# Patient Record
Sex: MALE | Race: WHITE | NOT HISPANIC OR LATINO | Employment: FULL TIME | URBAN - NONMETROPOLITAN AREA
[De-identification: names, ages, dates, MRNs, and addresses within clinical notes are randomized per-mention and may not be internally consistent; named-entity substitution may affect disease eponyms.]

---

## 2023-10-05 ENCOUNTER — HOSPITAL ENCOUNTER (EMERGENCY)
Facility: HOSPITAL | Age: 23
Discharge: HOME | End: 2023-10-05
Payer: COMMERCIAL

## 2023-10-05 VITALS
HEIGHT: 67 IN | RESPIRATION RATE: 18 BRPM | DIASTOLIC BLOOD PRESSURE: 77 MMHG | BODY MASS INDEX: 46.62 KG/M2 | HEART RATE: 92 BPM | WEIGHT: 297 LBS | OXYGEN SATURATION: 98 % | TEMPERATURE: 98.3 F | SYSTOLIC BLOOD PRESSURE: 144 MMHG

## 2023-10-05 DIAGNOSIS — R21 RASH: Primary | ICD-10-CM

## 2023-10-05 PROCEDURE — 2500000004 HC RX 250 GENERAL PHARMACY W/ HCPCS (ALT 636 FOR OP/ED): Performed by: PHYSICIAN ASSISTANT

## 2023-10-05 PROCEDURE — 99283 EMERGENCY DEPT VISIT LOW MDM: CPT

## 2023-10-05 PROCEDURE — 2500000001 HC RX 250 WO HCPCS SELF ADMINISTERED DRUGS (ALT 637 FOR MEDICARE OP): Performed by: PHYSICIAN ASSISTANT

## 2023-10-05 RX ORDER — LORATADINE 10 MG/1
10 TABLET ORAL DAILY
Qty: 30 TABLET | Refills: 0 | Status: SHIPPED | OUTPATIENT
Start: 2023-10-05 | End: 2023-11-04

## 2023-10-05 RX ORDER — PREDNISONE 20 MG/1
40 TABLET ORAL ONCE
Status: COMPLETED | OUTPATIENT
Start: 2023-10-05 | End: 2023-10-05

## 2023-10-05 RX ORDER — PREDNISONE 20 MG/1
40 TABLET ORAL 2 TIMES DAILY
Qty: 20 TABLET | Refills: 0 | Status: SHIPPED | OUTPATIENT
Start: 2023-10-05 | End: 2023-10-10

## 2023-10-05 RX ORDER — AMLODIPINE BESYLATE 5 MG/1
7.5 TABLET ORAL DAILY
COMMUNITY

## 2023-10-05 RX ORDER — DIPHENHYDRAMINE HCL 25 MG
50 CAPSULE ORAL ONCE
Status: COMPLETED | OUTPATIENT
Start: 2023-10-05 | End: 2023-10-05

## 2023-10-05 RX ADMIN — PREDNISONE 40 MG: 20 TABLET ORAL at 21:38

## 2023-10-05 RX ADMIN — DIPHENHYDRAMINE HYDROCHLORIDE 50 MG: 25 CAPSULE ORAL at 21:38

## 2023-10-05 ASSESSMENT — COLUMBIA-SUICIDE SEVERITY RATING SCALE - C-SSRS
1. IN THE PAST MONTH, HAVE YOU WISHED YOU WERE DEAD OR WISHED YOU COULD GO TO SLEEP AND NOT WAKE UP?: NO
6. HAVE YOU EVER DONE ANYTHING, STARTED TO DO ANYTHING, OR PREPARED TO DO ANYTHING TO END YOUR LIFE?: NO
2. HAVE YOU ACTUALLY HAD ANY THOUGHTS OF KILLING YOURSELF?: NO

## 2023-10-05 ASSESSMENT — ENCOUNTER SYMPTOMS
CHILLS: 0
STRIDOR: 0
WHEEZING: 0
FACIAL SWELLING: 0
SORE THROAT: 0
SHORTNESS OF BREATH: 0
FEVER: 0
TROUBLE SWALLOWING: 0

## 2023-10-05 ASSESSMENT — PAIN SCALES - GENERAL: PAINLEVEL_OUTOF10: 0 - NO PAIN

## 2023-10-05 ASSESSMENT — PAIN - FUNCTIONAL ASSESSMENT: PAIN_FUNCTIONAL_ASSESSMENT: 0-10

## 2023-10-05 NOTE — Clinical Note
Yevgeniy Cisneros was seen and treated in our emergency department on 10/5/2023.  He may return to work on 10/06/2023.       If you have any questions or concerns, please don't hesitate to call.      Quita Kay PA-C

## 2023-10-06 NOTE — ED PROVIDER NOTES
Patient is a 23-year-old male who presents to the emergency department with a chief complaint of a rash to bilateral lower extremities.  He denies the rash being itchy.  He reports that he was in the woods today and noticed the rash shortly thereafter.  He denies any new skin products or detergents.  No new soaps.  He denies any shortness of breath or difficulty breathing.  No throat, tongue, or lip swelling.  No alleviating or exacerbating factors.  He has not taken any over-the-counter medications.      History provided by:  Patient       Review of Systems   Constitutional:  Negative for chills and fever.   HENT:  Negative for facial swelling, sneezing, sore throat and trouble swallowing.    Respiratory:  Negative for shortness of breath, wheezing and stridor.    All other systems reviewed and are negative.       Physical Exam  Vitals and nursing note reviewed.   Constitutional:       General: He is not in acute distress.     Appearance: He is well-developed.   HENT:      Head: Normocephalic and atraumatic.      Mouth/Throat:      Mouth: No oral lesions or angioedema.   Eyes:      Extraocular Movements: Extraocular movements intact.      Conjunctiva/sclera: Conjunctivae normal.   Cardiovascular:      Rate and Rhythm: Normal rate and regular rhythm.      Heart sounds: No murmur heard.  Pulmonary:      Effort: Pulmonary effort is normal. No respiratory distress.      Breath sounds: Normal breath sounds.   Abdominal:      Palpations: Abdomen is soft.      Tenderness: There is no abdominal tenderness.   Musculoskeletal:         General: No swelling.      Cervical back: Neck supple.   Skin:     General: Skin is warm and dry.      Capillary Refill: Capillary refill takes less than 2 seconds.      Findings: Rash (maculopapular rash noted to bilateral calves) present.   Neurological:      Mental Status: He is alert.   Psychiatric:         Mood and Affect: Mood normal.          Labs Reviewed - No data to display     No  orders to display        Procedures     Diagnoses as of 10/05/23 2136   Rash       Medical Decision Making  Patient presents to the emergency department with a chief complaint of a rash to bilateral lower extremities.  Physical appearance is consistent with a contact dermatitis.  Patient treated with Benadryl and oral steroids.  Will be discharged home on a low-dose course of steroids and antihistamines.  Differential diagnosis includes but not limited to rash, contact dermatitis, Toxicodendron dermatitis, allergic reaction         Problem List Items Addressed This Visit    None  Visit Diagnoses       Rash    -  Primary    Relevant Medications    loratadine (Claritin) 10 mg tablet    predniSONE (Deltasone) 20 mg tablet                   Quita Kay PA-C  10/05/23 2137

## 2023-10-22 ENCOUNTER — HOSPITAL ENCOUNTER (EMERGENCY)
Facility: HOSPITAL | Age: 23
Discharge: HOME | End: 2023-10-22
Attending: EMERGENCY MEDICINE
Payer: COMMERCIAL

## 2023-10-22 ENCOUNTER — APPOINTMENT (OUTPATIENT)
Dept: RADIOLOGY | Facility: HOSPITAL | Age: 23
End: 2023-10-22
Payer: COMMERCIAL

## 2023-10-22 VITALS
HEART RATE: 83 BPM | TEMPERATURE: 97.2 F | RESPIRATION RATE: 18 BRPM | WEIGHT: 292 LBS | BODY MASS INDEX: 45.83 KG/M2 | SYSTOLIC BLOOD PRESSURE: 150 MMHG | OXYGEN SATURATION: 99 % | HEIGHT: 67 IN | DIASTOLIC BLOOD PRESSURE: 71 MMHG

## 2023-10-22 DIAGNOSIS — R06.02 SHORTNESS OF BREATH: Primary | ICD-10-CM

## 2023-10-22 PROCEDURE — 93005 ELECTROCARDIOGRAM TRACING: CPT

## 2023-10-22 PROCEDURE — 99283 EMERGENCY DEPT VISIT LOW MDM: CPT | Performed by: EMERGENCY MEDICINE

## 2023-10-22 PROCEDURE — 71046 X-RAY EXAM CHEST 2 VIEWS: CPT | Mod: FY,FR

## 2023-10-22 PROCEDURE — 71046 X-RAY EXAM CHEST 2 VIEWS: CPT | Mod: FOREIGN READ | Performed by: RADIOLOGY

## 2023-10-22 RX ORDER — ALBUTEROL SULFATE 90 UG/1
1-2 AEROSOL, METERED RESPIRATORY (INHALATION) EVERY 6 HOURS PRN
Qty: 18 G | Refills: 0 | Status: SHIPPED | OUTPATIENT
Start: 2023-10-22 | End: 2023-11-21

## 2023-10-22 ASSESSMENT — COLUMBIA-SUICIDE SEVERITY RATING SCALE - C-SSRS
6. HAVE YOU EVER DONE ANYTHING, STARTED TO DO ANYTHING, OR PREPARED TO DO ANYTHING TO END YOUR LIFE?: NO
2. HAVE YOU ACTUALLY HAD ANY THOUGHTS OF KILLING YOURSELF?: NO
1. IN THE PAST MONTH, HAVE YOU WISHED YOU WERE DEAD OR WISHED YOU COULD GO TO SLEEP AND NOT WAKE UP?: NO

## 2023-10-22 ASSESSMENT — PAIN - FUNCTIONAL ASSESSMENT: PAIN_FUNCTIONAL_ASSESSMENT: 0-10

## 2023-10-22 ASSESSMENT — PAIN SCALES - GENERAL: PAINLEVEL_OUTOF10: 0 - NO PAIN

## 2023-10-22 NOTE — ED PROVIDER NOTES
"HPI   Chief Complaint   Patient presents with    Shortness of Breath     Intermittent SOB x1 week accompanied with upper abdominal discomfort and then he states that he will have bouts of diarrhea afterwards. Pt states that he has lost 20 lbs in the last couple months and feels like that may have something to do with it. Denies dizziness or CP.        Patient presents to the emergency department secondary to dyspnea.  The patient states that this happens intermittently and has done so for several weeks.  He states \"I think I am just overdoing it at the gym\".  He states that the shortness of breath is not necessarily related to activity but sometimes it is.  He is somewhat vague in his history.  He also reports that he had a history of asthma as a child and he used to use an inhaler but has not for some time.  Denies chest pain.  No known sick contacts.  Denies cough or fever.      History provided by:  Patient   used: No                        No data recorded                Patient History   Past Medical History:   Diagnosis Date    Hypertension      History reviewed. No pertinent surgical history.  No family history on file.  Social History     Tobacco Use    Smoking status: Former     Types: Cigarettes    Smokeless tobacco: Never   Substance Use Topics    Alcohol use: Not Currently    Drug use: Not Currently       Physical Exam   ED Triage Vitals [10/22/23 1313]   Temp Heart Rate Resp BP   36.2 °C (97.2 °F) 83 18 150/71      SpO2 Temp src Heart Rate Source Patient Position   99 % -- Monitor --      BP Location FiO2 (%)     -- --       Physical Exam  Vitals and nursing note reviewed.   Constitutional:       General: He is not in acute distress.     Appearance: Normal appearance. He is obese. He is not ill-appearing, toxic-appearing or diaphoretic.      Comments: When I walk into the room the patient is lying in bed with his hands behind his head and his legs crossed.  He appears quite " comfortable.  He is not dyspneic or diaphoretic.  Speaking in full sentences.   HENT:      Head: Normocephalic and atraumatic.      Nose: Nose normal. No rhinorrhea.   Neck:      Comments: Trachea is midline  Cardiovascular:      Rate and Rhythm: Normal rate and regular rhythm.      Heart sounds: No murmur heard.  Pulmonary:      Effort: Pulmonary effort is normal. No tachypnea.      Breath sounds: Normal breath sounds. No decreased breath sounds, wheezing, rhonchi or rales.   Abdominal:      General: Abdomen is flat. Bowel sounds are normal. There is no distension.      Palpations: Abdomen is soft.      Tenderness: There is no abdominal tenderness.   Musculoskeletal:         General: Normal range of motion.      Cervical back: Normal range of motion.      Right lower leg: No edema.      Left lower leg: No edema.   Skin:     General: Skin is warm and dry.      Findings: No rash.   Neurological:      General: No focal deficit present.      Mental Status: He is alert and oriented to person, place, and time. Mental status is at baseline.   Psychiatric:         Mood and Affect: Mood normal.         Behavior: Behavior normal.         Thought Content: Thought content normal.         Judgment: Judgment normal.         ED Course & MDM   Diagnoses as of 10/22/23 1404   Shortness of breath       Medical Decision Making  Patient is at low risk as per his heart score for ACS.  He has no risk factors that I can identify for pulmonary embolism therefore I feel this is an unlikely diagnosis as per the Wells criteria.  Differential diagnosis would include, but not limited to, overexertion, mild underlying asthma, amongst many others.  Given the patient's well appearance, overall normal vital signs in regards to his pulse ox at heart rate, and negative studies here I feel he can be discharged safely.  Patient will be given a prescription for an MDI and instructed to follow-up with primary care.  Limit activity as tolerated and  return if worse    Amount and/or Complexity of Data Reviewed  ECG/medicine tests: independent interpretation performed.     Details: Twelve-lead EKG was interpreted by myself and this was noted to contribute directly to patient care.  Study reveals normal sinus rhythm with a sinus arrhythmia, rate of 81 bpm, normal axis, normal R wave progression, no acute ischemic changes, no S1 Q3 T3 is identified        Procedure  Procedures     Leonard Owusu, DO  10/22/23 1700

## 2024-05-21 ENCOUNTER — APPOINTMENT (OUTPATIENT)
Dept: RADIOLOGY | Facility: HOSPITAL | Age: 24
End: 2024-05-21
Payer: COMMERCIAL

## 2024-05-21 ENCOUNTER — HOSPITAL ENCOUNTER (OUTPATIENT)
Dept: CARDIOLOGY | Facility: HOSPITAL | Age: 24
Discharge: HOME | End: 2024-05-21
Payer: COMMERCIAL

## 2024-05-21 ENCOUNTER — HOSPITAL ENCOUNTER (EMERGENCY)
Facility: HOSPITAL | Age: 24
Discharge: HOME | End: 2024-05-21
Attending: EMERGENCY MEDICINE
Payer: COMMERCIAL

## 2024-05-21 VITALS
OXYGEN SATURATION: 98 % | BODY MASS INDEX: 35 KG/M2 | WEIGHT: 223 LBS | SYSTOLIC BLOOD PRESSURE: 125 MMHG | TEMPERATURE: 98.5 F | HEIGHT: 67 IN | DIASTOLIC BLOOD PRESSURE: 78 MMHG | HEART RATE: 73 BPM | RESPIRATION RATE: 16 BRPM

## 2024-05-21 DIAGNOSIS — I10 HYPERTENSION, UNSPECIFIED TYPE: ICD-10-CM

## 2024-05-21 DIAGNOSIS — G44.89 OTHER HEADACHE SYNDROME: Primary | ICD-10-CM

## 2024-05-21 LAB
ALBUMIN SERPL BCP-MCNC: 5 G/DL (ref 3.4–5)
ALP SERPL-CCNC: 66 U/L (ref 33–120)
ALT SERPL W P-5'-P-CCNC: 21 U/L (ref 10–52)
ANION GAP SERPL CALC-SCNC: 9 MMOL/L (ref 10–20)
APPEARANCE UR: CLEAR
AST SERPL W P-5'-P-CCNC: 20 U/L (ref 9–39)
BASOPHILS # BLD AUTO: 0.04 X10*3/UL (ref 0–0.1)
BASOPHILS NFR BLD AUTO: 0.6 %
BILIRUB DIRECT SERPL-MCNC: 0.2 MG/DL (ref 0–0.3)
BILIRUB SERPL-MCNC: 0.9 MG/DL (ref 0–1.2)
BILIRUB UR STRIP.AUTO-MCNC: NEGATIVE MG/DL
BUN SERPL-MCNC: 17 MG/DL (ref 6–23)
CALCIUM SERPL-MCNC: 9.8 MG/DL (ref 8.6–10.3)
CHLORIDE SERPL-SCNC: 109 MMOL/L (ref 98–107)
CO2 SERPL-SCNC: 25 MMOL/L (ref 21–32)
COLOR UR: YELLOW
CREAT SERPL-MCNC: 0.95 MG/DL (ref 0.5–1.3)
EGFRCR SERPLBLD CKD-EPI 2021: >90 ML/MIN/1.73M*2
EOSINOPHIL # BLD AUTO: 0.19 X10*3/UL (ref 0–0.7)
EOSINOPHIL NFR BLD AUTO: 2.9 %
ERYTHROCYTE [DISTWIDTH] IN BLOOD BY AUTOMATED COUNT: 12.7 % (ref 11.5–14.5)
FLUAV RNA RESP QL NAA+PROBE: NOT DETECTED
FLUBV RNA RESP QL NAA+PROBE: NOT DETECTED
GLUCOSE SERPL-MCNC: 97 MG/DL (ref 74–99)
GLUCOSE UR STRIP.AUTO-MCNC: NEGATIVE MG/DL
HCT VFR BLD AUTO: 45.3 % (ref 41–52)
HGB BLD-MCNC: 15.4 G/DL (ref 13.5–17.5)
HOLD SPECIMEN: NORMAL
IMM GRANULOCYTES # BLD AUTO: 0.01 X10*3/UL (ref 0–0.7)
IMM GRANULOCYTES NFR BLD AUTO: 0.2 % (ref 0–0.9)
KETONES UR STRIP.AUTO-MCNC: NEGATIVE MG/DL
LEUKOCYTE ESTERASE UR QL STRIP.AUTO: NEGATIVE
LIPASE SERPL-CCNC: 27 U/L (ref 9–82)
LYMPHOCYTES # BLD AUTO: 1.53 X10*3/UL (ref 1.2–4.8)
LYMPHOCYTES NFR BLD AUTO: 23.3 %
MCH RBC QN AUTO: 28.6 PG (ref 26–34)
MCHC RBC AUTO-ENTMCNC: 34 G/DL (ref 32–36)
MCV RBC AUTO: 84 FL (ref 80–100)
MONOCYTES # BLD AUTO: 0.29 X10*3/UL (ref 0.1–1)
MONOCYTES NFR BLD AUTO: 4.4 %
NEUTROPHILS # BLD AUTO: 4.5 X10*3/UL (ref 1.2–7.7)
NEUTROPHILS NFR BLD AUTO: 68.6 %
NITRITE UR QL STRIP.AUTO: NEGATIVE
NRBC BLD-RTO: 0 /100 WBCS (ref 0–0)
PH UR STRIP.AUTO: 6 [PH]
PLATELET # BLD AUTO: 186 X10*3/UL (ref 150–450)
POTASSIUM SERPL-SCNC: 3.7 MMOL/L (ref 3.5–5.3)
PROT SERPL-MCNC: 7.2 G/DL (ref 6.4–8.2)
PROT UR STRIP.AUTO-MCNC: NEGATIVE MG/DL
RBC # BLD AUTO: 5.39 X10*6/UL (ref 4.5–5.9)
RBC # UR STRIP.AUTO: NEGATIVE /UL
SARS-COV-2 RNA RESP QL NAA+PROBE: NOT DETECTED
SODIUM SERPL-SCNC: 139 MMOL/L (ref 136–145)
SP GR UR STRIP.AUTO: 1.02
UROBILINOGEN UR STRIP.AUTO-MCNC: <2 MG/DL
WBC # BLD AUTO: 6.6 X10*3/UL (ref 4.4–11.3)

## 2024-05-21 PROCEDURE — 82374 ASSAY BLOOD CARBON DIOXIDE: CPT | Performed by: EMERGENCY MEDICINE

## 2024-05-21 PROCEDURE — 84075 ASSAY ALKALINE PHOSPHATASE: CPT | Performed by: EMERGENCY MEDICINE

## 2024-05-21 PROCEDURE — 87502 INFLUENZA DNA AMP PROBE: CPT | Performed by: EMERGENCY MEDICINE

## 2024-05-21 PROCEDURE — 99285 EMERGENCY DEPT VISIT HI MDM: CPT | Mod: 25

## 2024-05-21 PROCEDURE — 72040 X-RAY EXAM NECK SPINE 2-3 VW: CPT | Mod: FOREIGN READ | Performed by: RADIOLOGY

## 2024-05-21 PROCEDURE — 81003 URINALYSIS AUTO W/O SCOPE: CPT | Performed by: EMERGENCY MEDICINE

## 2024-05-21 PROCEDURE — 93005 ELECTROCARDIOGRAM TRACING: CPT

## 2024-05-21 PROCEDURE — 70450 CT HEAD/BRAIN W/O DYE: CPT | Performed by: RADIOLOGY

## 2024-05-21 PROCEDURE — 36415 COLL VENOUS BLD VENIPUNCTURE: CPT | Performed by: EMERGENCY MEDICINE

## 2024-05-21 PROCEDURE — 70450 CT HEAD/BRAIN W/O DYE: CPT

## 2024-05-21 PROCEDURE — 72040 X-RAY EXAM NECK SPINE 2-3 VW: CPT

## 2024-05-21 PROCEDURE — 85025 COMPLETE CBC W/AUTO DIFF WBC: CPT | Performed by: EMERGENCY MEDICINE

## 2024-05-21 PROCEDURE — 83690 ASSAY OF LIPASE: CPT | Performed by: EMERGENCY MEDICINE

## 2024-05-21 PROCEDURE — 71045 X-RAY EXAM CHEST 1 VIEW: CPT

## 2024-05-21 PROCEDURE — 71045 X-RAY EXAM CHEST 1 VIEW: CPT | Mod: FOREIGN READ | Performed by: RADIOLOGY

## 2024-05-21 RX ORDER — ONDANSETRON HYDROCHLORIDE 2 MG/ML
4 INJECTION, SOLUTION INTRAVENOUS ONCE
Status: DISCONTINUED | OUTPATIENT
Start: 2024-05-21 | End: 2024-05-21 | Stop reason: HOSPADM

## 2024-05-21 ASSESSMENT — ENCOUNTER SYMPTOMS
NAUSEA: 1
SORE THROAT: 0
NECK STIFFNESS: 0
ABDOMINAL PAIN: 0
FLANK PAIN: 0
COUGH: 1
FATIGUE: 1
HEADACHES: 1
FEVER: 0
MYALGIAS: 1
PSYCHIATRIC NEGATIVE: 1
NECK PAIN: 0
PALPITATIONS: 0
VOMITING: 0
DIZZINESS: 0
CHILLS: 0

## 2024-05-21 ASSESSMENT — COLUMBIA-SUICIDE SEVERITY RATING SCALE - C-SSRS
2. HAVE YOU ACTUALLY HAD ANY THOUGHTS OF KILLING YOURSELF?: NO
1. IN THE PAST MONTH, HAVE YOU WISHED YOU WERE DEAD OR WISHED YOU COULD GO TO SLEEP AND NOT WAKE UP?: NO
6. HAVE YOU EVER DONE ANYTHING, STARTED TO DO ANYTHING, OR PREPARED TO DO ANYTHING TO END YOUR LIFE?: NO

## 2024-05-21 ASSESSMENT — PAIN - FUNCTIONAL ASSESSMENT: PAIN_FUNCTIONAL_ASSESSMENT: 0-10

## 2024-05-21 ASSESSMENT — PAIN SCALES - GENERAL: PAINLEVEL_OUTOF10: 0 - NO PAIN

## 2024-05-21 NOTE — ED PROVIDER NOTES
Chief Complaint: HEADACHE    23-year-old male who presents complaining of a headache that radiates from the right posterior orbital area to the right ear he states he had some flulike symptoms with some nausea and general malaise.  He thought it was related to his blood pressure as he had a similar episode 9 months ago and his blood pressure was elevated.  Since then he started on antihypertensive medicines and lost 100 pounds and his blood pressures fairly well normalized however he states he has severe anxiety.  He denies any chest pain has rare cough.  Denies any fever chills no visual changes no weakness in arms or legs no speech difficulties otherwise and presents now for evaluation           Review of Systems   Constitutional:  Positive for fatigue. Negative for chills and fever.   HENT:  Positive for congestion. Negative for sore throat.    Respiratory:  Positive for cough.    Cardiovascular:  Negative for chest pain, palpitations and leg swelling.   Gastrointestinal:  Positive for nausea. Negative for abdominal pain and vomiting.   Genitourinary:  Negative for flank pain.   Musculoskeletal:  Positive for myalgias. Negative for neck pain and neck stiffness.   Skin:  Negative for rash.   Neurological:  Positive for headaches. Negative for dizziness.   Psychiatric/Behavioral: Negative.     All other systems reviewed and are negative.       Physical Exam  Constitutional:       General: He is not in acute distress.     Appearance: He is well-developed. He is not ill-appearing or toxic-appearing.   HENT:      Head: Normocephalic and atraumatic.      Mouth/Throat:      Mouth: Mucous membranes are moist.      Pharynx: Oropharynx is clear.   Eyes:      General: No visual field deficit.     Extraocular Movements: Extraocular movements intact.      Pupils: Pupils are equal, round, and reactive to light.   Neck:      Meningeal: Brudzinski's sign and Kernig's sign absent.   Cardiovascular:      Rate and Rhythm: Normal  rate.      Heart sounds: Normal heart sounds.   Pulmonary:      Effort: Pulmonary effort is normal. No respiratory distress.      Breath sounds: Normal breath sounds. No wheezing or rhonchi.   Abdominal:      General: Bowel sounds are normal. There is no distension.      Palpations: Abdomen is soft.   Musculoskeletal:         General: No swelling or tenderness. Normal range of motion.      Cervical back: Normal range of motion and neck supple. No rigidity.   Lymphadenopathy:      Cervical: No cervical adenopathy.   Skin:     General: Skin is warm and dry.      Capillary Refill: Capillary refill takes less than 2 seconds.      Findings: No rash.   Neurological:      Mental Status: He is alert and oriented to person, place, and time.      GCS: GCS eye subscore is 4. GCS verbal subscore is 5. GCS motor subscore is 6.      Cranial Nerves: No cranial nerve deficit, dysarthria or facial asymmetry.      Sensory: No sensory deficit.   Psychiatric:         Mood and Affect: Mood is anxious.         Behavior: Behavior normal.          Labs Reviewed   BASIC METABOLIC PANEL - Abnormal       Result Value    Glucose 97      Sodium 139      Potassium 3.7      Chloride 109 (*)     Bicarbonate 25      Anion Gap 9 (*)     Urea Nitrogen 17      Creatinine 0.95      eGFR >90      Calcium 9.8     LIPASE - Normal    Lipase 27      Narrative:     Venipuncture immediately after or during the administration of Metamizole may lead to falsely low results. Testing should be performed immediately prior to Metamizole dosing.   HEPATIC FUNCTION PANEL - Normal    Albumin 5.0      Bilirubin, Total 0.9      Bilirubin, Direct 0.2      Alkaline Phosphatase 66      ALT 21      AST 20      Total Protein 7.2     INFLUENZA A AND B PCR - Normal    Flu A Result Not Detected      Flu B Result Not Detected      Narrative:     This assay is an in vitro diagnostic multiplex nucleic acid amplification test for the detection and discrimination of Influenza A & B  from nasopharyngeal specimens, and has been validated for use at [facility]. Negative results do not preclude Influenza A/B infections, and should not be used as the sole basis for diagnosis, treatment, or other management decisions. If Influenza A/B and RSV PCR results are negative, testing for Parainfluenza virus, Adenovirus and Metapneumovirus is routinely performed for [facility] pediatric oncology and intensive care inpatients, and is available on other patients by placing an add-on request.   SARS-COV-2 PCR - Normal    Coronavirus 2019, PCR Not Detected      Narrative:     This assay has received FDA Emergency Use Authorization (EUA) and is only authorized for the duration of time that circumstances exist to justify the authorization of the emergency use of in vitro diagnostic tests for the detection of SARS-CoV-2 virus and/or diagnosis of COVID-19 infection under section 564(b)(1) of the Act, 21 U.S.C. 360bbb-3(b)(1). This assay is an in vitro diagnostic nucleic acid amplification test for the qualitative detection of SARS-CoV-2 from nasopharyngeal specimens and has been validated for use at [facility]. Negative results do not preclude COVID-19 infections and should not be used as the sole basis for diagnosis, treatment, or other management decisions.     URINALYSIS WITH REFLEX CULTURE AND MICROSCOPIC - Normal    Color, Urine Yellow      Appearance, Urine Clear      Specific Gravity, Urine 1.024      pH, Urine 6.0      Protein, Urine NEGATIVE      Glucose, Urine NEGATIVE      Blood, Urine NEGATIVE      Ketones, Urine NEGATIVE      Bilirubin, Urine NEGATIVE      Urobilinogen, Urine <2.0      Nitrite, Urine NEGATIVE      Leukocyte Esterase, Urine NEGATIVE     CBC WITH AUTO DIFFERENTIAL    WBC 6.6      nRBC 0.0      RBC 5.39      Hemoglobin 15.4      Hematocrit 45.3      MCV 84      MCH 28.6      MCHC 34.0      RDW 12.7      Platelets 186      Neutrophils % 68.6       Immature Granulocytes %, Automated 0.2      Lymphocytes % 23.3      Monocytes % 4.4      Eosinophils % 2.9      Basophils % 0.6      Neutrophils Absolute 4.50      Immature Granulocytes Absolute, Automated 0.01      Lymphocytes Absolute 1.53      Monocytes Absolute 0.29      Eosinophils Absolute 0.19      Basophils Absolute 0.04     URINALYSIS WITH REFLEX CULTURE AND MICROSCOPIC    Narrative:     The following orders were created for panel order Urinalysis with Reflex Culture and Microscopic.  Procedure                               Abnormality         Status                     ---------                               -----------         ------                     Urinalysis with Reflex Cu...[[id number]]  Normal              Final result               Extra Urine Gray Tube[[id number]]                             In process                   Please view results for these tests on the individual orders.   EXTRA URINE GRAY TUBE        XR chest 1 view   Final Result   No acute cardiopulmonary abnormality.   Signed by Jonas Manjarrez MD      XR cervical spine 2-3 views   Final Result   Unremarkable cervical spine.   Signed by Jonas Manjarrez MD      CT head wo IV contrast   Final Result   No acute intracranial pathologic findings are identified.             MACRO:   none        Signed by: Ney Borrego 5/21/2024 8:28 AM   Dictation workstation:   [id number]           Procedures     Medical Decision Making  Differential diagnose include influenza COVID-19 atypical headache sinusitis influenza uncontrolled hypertension.  IV was ordered of normal saline with 1 L bolus Zofran 4 mg intravenously as well as CT scan of the head x-rays of the neck and chest and labs at this time.  Since COVID and influenza testing were normal liver function testing lipase were also normal.  The [place] panel urinalysis CBC were normal.  His EKG was unremarkable.  CT scan of the brain was also normal.  His blood pressure normalized from 166 when he  originally presented and his headache also resolved.  His headache could be resolved to a sensitivity to minor blood pressure elevations.  He states with his weight loss his Norvasc was decreased from 10 to 5 mg.  He was advised to get a blood pressure cuff and monitor his blood pressure at home and possibly he still requires a higher dose of Norvasc at this time he is to follow-up with his primary care physician this week.    Amount and/or Complexity of Data Reviewed  ECG/medicine tests: independent interpretation performed.     Details: Twelve-lead EKG showed sinus twelve-lead EKG showed sinus bradycardia at 59/min but no acute ST segment elevation depressions or arrhythmias as interpreted by myself the emergency physician         Diagnoses as of 05/21/24 1015   Other headache syndrome   Hypertension, unspecified type                    Alexys Santos MD  05/21/24 1015

## 2024-05-25 LAB
ATRIAL RATE: 59 BPM
P AXIS: 37 DEGREES
P OFFSET: 184 MS
P ONSET: 139 MS
PR INTERVAL: 156 MS
Q ONSET: 217 MS
QRS COUNT: 10 BEATS
QRS DURATION: 88 MS
QT INTERVAL: 412 MS
QTC CALCULATION(BAZETT): 407 MS
QTC FREDERICIA: 409 MS
R AXIS: 78 DEGREES
T AXIS: 9 DEGREES
T OFFSET: 423 MS
VENTRICULAR RATE: 59 BPM

## 2024-07-09 ENCOUNTER — APPOINTMENT (OUTPATIENT)
Dept: RADIOLOGY | Facility: HOSPITAL | Age: 24
End: 2024-07-09
Payer: COMMERCIAL

## 2024-07-09 ENCOUNTER — HOSPITAL ENCOUNTER (EMERGENCY)
Facility: HOSPITAL | Age: 24
Discharge: HOME | End: 2024-07-09
Attending: EMERGENCY MEDICINE
Payer: COMMERCIAL

## 2024-07-09 ENCOUNTER — HOSPITAL ENCOUNTER (OUTPATIENT)
Dept: CARDIOLOGY | Facility: HOSPITAL | Age: 24
Discharge: HOME | End: 2024-07-09
Payer: COMMERCIAL

## 2024-07-09 VITALS
BODY MASS INDEX: 35.31 KG/M2 | HEART RATE: 63 BPM | SYSTOLIC BLOOD PRESSURE: 128 MMHG | DIASTOLIC BLOOD PRESSURE: 74 MMHG | WEIGHT: 225 LBS | OXYGEN SATURATION: 97 % | HEIGHT: 67 IN | TEMPERATURE: 97 F | RESPIRATION RATE: 18 BRPM

## 2024-07-09 DIAGNOSIS — R07.9 CHEST PAIN, UNSPECIFIED TYPE: Primary | ICD-10-CM

## 2024-07-09 LAB
ALBUMIN SERPL BCP-MCNC: 5.1 G/DL (ref 3.4–5)
ALP SERPL-CCNC: 84 U/L (ref 33–120)
ALT SERPL W P-5'-P-CCNC: 26 U/L (ref 10–52)
ANION GAP SERPL CALC-SCNC: 14 MMOL/L (ref 10–20)
AST SERPL W P-5'-P-CCNC: 22 U/L (ref 9–39)
BASOPHILS # BLD AUTO: 0.06 X10*3/UL (ref 0–0.1)
BASOPHILS NFR BLD AUTO: 0.7 %
BILIRUB SERPL-MCNC: 0.6 MG/DL (ref 0–1.2)
BUN SERPL-MCNC: 24 MG/DL (ref 6–23)
CALCIUM SERPL-MCNC: 10.2 MG/DL (ref 8.6–10.3)
CARDIAC TROPONIN I PNL SERPL HS: 5 NG/L (ref 0–20)
CARDIAC TROPONIN I PNL SERPL HS: 5 NG/L (ref 0–20)
CHLORIDE SERPL-SCNC: 107 MMOL/L (ref 98–107)
CO2 SERPL-SCNC: 25 MMOL/L (ref 21–32)
CREAT SERPL-MCNC: 1 MG/DL (ref 0.5–1.3)
EGFRCR SERPLBLD CKD-EPI 2021: >90 ML/MIN/1.73M*2
EOSINOPHIL # BLD AUTO: 0.33 X10*3/UL (ref 0–0.7)
EOSINOPHIL NFR BLD AUTO: 3.8 %
ERYTHROCYTE [DISTWIDTH] IN BLOOD BY AUTOMATED COUNT: 12.8 % (ref 11.5–14.5)
GLUCOSE SERPL-MCNC: 91 MG/DL (ref 74–99)
HCT VFR BLD AUTO: 44.5 % (ref 41–52)
HGB BLD-MCNC: 15.4 G/DL (ref 13.5–17.5)
HOLD SPECIMEN: NORMAL
IMM GRANULOCYTES # BLD AUTO: 0.02 X10*3/UL (ref 0–0.7)
IMM GRANULOCYTES NFR BLD AUTO: 0.2 % (ref 0–0.9)
INR PPP: 1 (ref 0.9–1.1)
LYMPHOCYTES # BLD AUTO: 2.26 X10*3/UL (ref 1.2–4.8)
LYMPHOCYTES NFR BLD AUTO: 26.2 %
MCH RBC QN AUTO: 28.9 PG (ref 26–34)
MCHC RBC AUTO-ENTMCNC: 34.6 G/DL (ref 32–36)
MCV RBC AUTO: 84 FL (ref 80–100)
MONOCYTES # BLD AUTO: 0.48 X10*3/UL (ref 0.1–1)
MONOCYTES NFR BLD AUTO: 5.6 %
NEUTROPHILS # BLD AUTO: 5.47 X10*3/UL (ref 1.2–7.7)
NEUTROPHILS NFR BLD AUTO: 63.5 %
NRBC BLD-RTO: 0 /100 WBCS (ref 0–0)
PLATELET # BLD AUTO: 198 X10*3/UL (ref 150–450)
POTASSIUM SERPL-SCNC: 3.7 MMOL/L (ref 3.5–5.3)
PROT SERPL-MCNC: 7.5 G/DL (ref 6.4–8.2)
PROTHROMBIN TIME: 11.2 SECONDS (ref 9.8–12.8)
RBC # BLD AUTO: 5.33 X10*6/UL (ref 4.5–5.9)
SODIUM SERPL-SCNC: 142 MMOL/L (ref 136–145)
WBC # BLD AUTO: 8.6 X10*3/UL (ref 4.4–11.3)

## 2024-07-09 PROCEDURE — 93005 ELECTROCARDIOGRAM TRACING: CPT

## 2024-07-09 PROCEDURE — 85025 COMPLETE CBC W/AUTO DIFF WBC: CPT | Performed by: EMERGENCY MEDICINE

## 2024-07-09 PROCEDURE — 84484 ASSAY OF TROPONIN QUANT: CPT | Performed by: EMERGENCY MEDICINE

## 2024-07-09 PROCEDURE — 2500000004 HC RX 250 GENERAL PHARMACY W/ HCPCS (ALT 636 FOR OP/ED): Performed by: EMERGENCY MEDICINE

## 2024-07-09 PROCEDURE — 96374 THER/PROPH/DIAG INJ IV PUSH: CPT

## 2024-07-09 PROCEDURE — 36415 COLL VENOUS BLD VENIPUNCTURE: CPT | Performed by: EMERGENCY MEDICINE

## 2024-07-09 PROCEDURE — 99284 EMERGENCY DEPT VISIT MOD MDM: CPT | Mod: 25

## 2024-07-09 PROCEDURE — 80053 COMPREHEN METABOLIC PANEL: CPT | Performed by: EMERGENCY MEDICINE

## 2024-07-09 PROCEDURE — 71045 X-RAY EXAM CHEST 1 VIEW: CPT

## 2024-07-09 PROCEDURE — 85610 PROTHROMBIN TIME: CPT | Performed by: EMERGENCY MEDICINE

## 2024-07-09 PROCEDURE — 71045 X-RAY EXAM CHEST 1 VIEW: CPT | Mod: FOREIGN READ | Performed by: RADIOLOGY

## 2024-07-09 RX ORDER — KETOROLAC TROMETHAMINE 30 MG/ML
15 INJECTION, SOLUTION INTRAMUSCULAR; INTRAVENOUS ONCE
Status: COMPLETED | OUTPATIENT
Start: 2024-07-09 | End: 2024-07-09

## 2024-07-09 ASSESSMENT — COLUMBIA-SUICIDE SEVERITY RATING SCALE - C-SSRS
1. IN THE PAST MONTH, HAVE YOU WISHED YOU WERE DEAD OR WISHED YOU COULD GO TO SLEEP AND NOT WAKE UP?: NO
2. HAVE YOU ACTUALLY HAD ANY THOUGHTS OF KILLING YOURSELF?: NO
6. HAVE YOU EVER DONE ANYTHING, STARTED TO DO ANYTHING, OR PREPARED TO DO ANYTHING TO END YOUR LIFE?: NO

## 2024-07-09 ASSESSMENT — PAIN - FUNCTIONAL ASSESSMENT: PAIN_FUNCTIONAL_ASSESSMENT: 0-10

## 2024-07-09 ASSESSMENT — PAIN SCALES - GENERAL
PAINLEVEL_OUTOF10: 0 - NO PAIN
PAINLEVEL_OUTOF10: 4

## 2024-07-09 ASSESSMENT — HEART SCORE
HEART SCORE: 1
HISTORY: SLIGHTLY SUSPICIOUS
ECG: NORMAL
AGE: <45
TROPONIN: LESS THAN OR EQUAL TO NORMAL LIMIT
RISK FACTORS: 1-2 RISK FACTORS

## 2024-07-10 NOTE — ED PROVIDER NOTES
23-year-old male chief complaint of left-sided chest pain into his left upper back.  He woke up with it yesterday morning.  It is intermittent and described as a dull ache.  He is unable to describe what seems to make it come or go.  He is currently asymptomatic.  No associated shortness of breath.  No radiating symptoms to extremity neck or jaw.         Review of Systems     Physical Exam  Vitals and nursing note reviewed.   Constitutional:       General: He is not in acute distress.     Appearance: He is well-developed.   HENT:      Head: Normocephalic and atraumatic.   Eyes:      Conjunctiva/sclera: Conjunctivae normal.   Cardiovascular:      Rate and Rhythm: Normal rate and regular rhythm.      Heart sounds: No murmur heard.  Pulmonary:      Effort: Pulmonary effort is normal. No respiratory distress.      Breath sounds: Normal breath sounds.   Abdominal:      Palpations: Abdomen is soft.      Tenderness: There is no abdominal tenderness.   Musculoskeletal:         General: No swelling.      Cervical back: Neck supple.   Skin:     General: Skin is warm and dry.      Capillary Refill: Capillary refill takes less than 2 seconds.   Neurological:      Mental Status: He is alert.   Psychiatric:         Mood and Affect: Mood normal.          Labs Reviewed   COMPREHENSIVE METABOLIC PANEL - Abnormal       Result Value    Glucose 91      Sodium 142      Potassium 3.7      Chloride 107      Bicarbonate 25      Anion Gap 14      Urea Nitrogen 24 (*)     Creatinine 1.00      eGFR >90      Calcium 10.2      Albumin 5.1 (*)     Alkaline Phosphatase 84      Total Protein 7.5      AST 22      Bilirubin, Total 0.6      ALT 26     PROTIME-INR - Normal    Protime 11.2      INR 1.0     SERIAL TROPONIN-INITIAL - Normal    Troponin I, High Sensitivity 5      Narrative:     Less than 99th percentile of normal range cutoff-  Female and children under 18 years old <14 ng/L; Male <21 ng/L: Negative  Repeat testing should be performed if  clinically indicated.     Female and children under 18 years old 14-50 ng/L; Male 21-50 ng/L:  Consistent with possible cardiac damage and possible increased clinical   risk. Serial measurements may help to assess extent of myocardial damage.     >50 ng/L: Consistent with cardiac damage, increased clinical risk and  myocardial infarction. Serial measurements may help assess extent of   myocardial damage.      NOTE: Children less than 1 year old may have higher baseline troponin   levels and results should be interpreted in conjunction with the overall   clinical context.     NOTE: Troponin I testing is performed using a different   testing methodology at [facility] than at other   [facility]. Direct result comparisons should only   be made within the same method.   SERIAL TROPONIN, 1 HOUR - Normal    Troponin I, High Sensitivity 5      Narrative:     Less than 99th percentile of normal range cutoff-  Female and children under 18 years old <14 ng/L; Male <21 ng/L: Negative  Repeat testing should be performed if clinically indicated.     Female and children under 18 years old 14-50 ng/L; Male 21-50 ng/L:  Consistent with possible cardiac damage and possible increased clinical   risk. Serial measurements may help to assess extent of myocardial damage.     >50 ng/L: Consistent with cardiac damage, increased clinical risk and  myocardial infarction. Serial measurements may help assess extent of   myocardial damage.      NOTE: Children less than 1 year old may have higher baseline troponin   levels and results should be interpreted in conjunction with the overall   clinical context.     NOTE: Troponin I testing is performed using a different   testing methodology at [facility] than at other   [facility]. Direct result comparisons should only   be made within the same method.   CBC WITH AUTO DIFFERENTIAL    WBC 8.6      nRBC 0.0      RBC 5.33      Hemoglobin 15.4      Hematocrit 44.5       MCV 84      MCH 28.9      MCHC 34.6      RDW 12.8      Platelets 198      Neutrophils % 63.5      Immature Granulocytes %, Automated 0.2      Lymphocytes % 26.2      Monocytes % 5.6      Eosinophils % 3.8      Basophils % 0.7      Neutrophils Absolute 5.47      Immature Granulocytes Absolute, Automated 0.02      Lymphocytes Absolute 2.26      Monocytes Absolute 0.48      Eosinophils Absolute 0.33      Basophils Absolute 0.06     TROPONIN SERIES- (INITIAL, 1 HR)    Narrative:     The following orders were created for panel order Troponin I Series, High Sensitivity (0, 1 HR).  Procedure                               Abnormality         Status                     ---------                               -----------         ------                     Troponin I, High Sensiti...[[id number]]  Normal              Final result               Troponin, High Sensitivi...[[id number]]  Normal              Final result                 Please view results for these tests on the individual orders.        XR chest 1 view   Final Result   No acute cardiopulmonary disease.   Signed by Ney Colon, DO           Procedures     Medical Decision Making  23-year-old male chief complaint of left-sided chest pain into his left upper back.  He woke up with it yesterday morning.  It is intermittent and described as a dull ache.  He is unable to describe what seems to make it come or go.  He is currently asymptomatic.  No associated shortness of breath.  No radiating symptoms to extremity neck or jaw.  EKG is negative for any ST changes, no dysrhythmia on the monitor.  Initial and repeat troponins are negative.  Do not think his symptoms are due to ACS.  Patient can be discharged.  He was given 15 mg IVP Toradol prior to leaving.    DDx: ACS, musculoskeletal chest pain, pleurisy    Amount and/or Complexity of Data Reviewed  ECG/medicine tests: independent interpretation performed.     Details: Sinus rhythm rate of 74, narrow complex,  normal axis, no ST elevation or depression, no ectopy.         Diagnoses as of 07/09/24 2151   Chest pain, unspecified type                    Paulie Colunga MD  07/09/24 2152

## 2024-07-14 LAB
ATRIAL RATE: 74 BPM
P AXIS: 42 DEGREES
P OFFSET: 195 MS
P ONSET: 144 MS
PR INTERVAL: 160 MS
Q ONSET: 224 MS
QRS COUNT: 12 BEATS
QRS DURATION: 86 MS
QT INTERVAL: 376 MS
QTC CALCULATION(BAZETT): 417 MS
QTC FREDERICIA: 403 MS
R AXIS: 49 DEGREES
T AXIS: 38 DEGREES
T OFFSET: 412 MS
VENTRICULAR RATE: 74 BPM

## 2024-09-15 ENCOUNTER — HOSPITAL ENCOUNTER (EMERGENCY)
Facility: HOSPITAL | Age: 24
Discharge: HOME | End: 2024-09-15
Attending: EMERGENCY MEDICINE
Payer: COMMERCIAL

## 2024-09-15 ENCOUNTER — APPOINTMENT (OUTPATIENT)
Dept: CARDIOLOGY | Facility: HOSPITAL | Age: 24
End: 2024-09-15
Payer: COMMERCIAL

## 2024-09-15 ENCOUNTER — APPOINTMENT (OUTPATIENT)
Dept: RADIOLOGY | Facility: HOSPITAL | Age: 24
End: 2024-09-15
Payer: COMMERCIAL

## 2024-09-15 VITALS
HEART RATE: 66 BPM | HEIGHT: 67 IN | RESPIRATION RATE: 16 BRPM | DIASTOLIC BLOOD PRESSURE: 65 MMHG | OXYGEN SATURATION: 96 % | WEIGHT: 229 LBS | BODY MASS INDEX: 35.94 KG/M2 | SYSTOLIC BLOOD PRESSURE: 137 MMHG | TEMPERATURE: 98.2 F

## 2024-09-15 DIAGNOSIS — R07.89 CHEST WALL PAIN: Primary | ICD-10-CM

## 2024-09-15 LAB
ALBUMIN SERPL BCP-MCNC: 4.9 G/DL (ref 3.4–5)
ALP SERPL-CCNC: 74 U/L (ref 33–120)
ALT SERPL W P-5'-P-CCNC: 24 U/L (ref 10–52)
ANION GAP SERPL CALC-SCNC: 13 MMOL/L (ref 10–20)
AST SERPL W P-5'-P-CCNC: 26 U/L (ref 9–39)
BASOPHILS # BLD AUTO: 0.06 X10*3/UL (ref 0–0.1)
BASOPHILS NFR BLD AUTO: 0.8 %
BILIRUB SERPL-MCNC: 0.9 MG/DL (ref 0–1.2)
BUN SERPL-MCNC: 14 MG/DL (ref 6–23)
CALCIUM SERPL-MCNC: 10.1 MG/DL (ref 8.6–10.3)
CARDIAC TROPONIN I PNL SERPL HS: 4 NG/L (ref 0–20)
CARDIAC TROPONIN I PNL SERPL HS: 4 NG/L (ref 0–20)
CHLORIDE SERPL-SCNC: 106 MMOL/L (ref 98–107)
CO2 SERPL-SCNC: 26 MMOL/L (ref 21–32)
CREAT SERPL-MCNC: 0.95 MG/DL (ref 0.5–1.3)
EGFRCR SERPLBLD CKD-EPI 2021: >90 ML/MIN/1.73M*2
EOSINOPHIL # BLD AUTO: 0.3 X10*3/UL (ref 0–0.7)
EOSINOPHIL NFR BLD AUTO: 4.1 %
ERYTHROCYTE [DISTWIDTH] IN BLOOD BY AUTOMATED COUNT: 12.5 % (ref 11.5–14.5)
FLUAV RNA RESP QL NAA+PROBE: NOT DETECTED
FLUBV RNA RESP QL NAA+PROBE: NOT DETECTED
GLUCOSE SERPL-MCNC: 86 MG/DL (ref 74–99)
HCT VFR BLD AUTO: 44.9 % (ref 41–52)
HGB BLD-MCNC: 15.5 G/DL (ref 13.5–17.5)
IMM GRANULOCYTES # BLD AUTO: 0.03 X10*3/UL (ref 0–0.7)
IMM GRANULOCYTES NFR BLD AUTO: 0.4 % (ref 0–0.9)
LYMPHOCYTES # BLD AUTO: 1.75 X10*3/UL (ref 1.2–4.8)
LYMPHOCYTES NFR BLD AUTO: 23.8 %
MAGNESIUM SERPL-MCNC: 2.09 MG/DL (ref 1.6–2.4)
MCH RBC QN AUTO: 28.7 PG (ref 26–34)
MCHC RBC AUTO-ENTMCNC: 34.5 G/DL (ref 32–36)
MCV RBC AUTO: 83 FL (ref 80–100)
MONOCYTES # BLD AUTO: 0.37 X10*3/UL (ref 0.1–1)
MONOCYTES NFR BLD AUTO: 5 %
NEUTROPHILS # BLD AUTO: 4.85 X10*3/UL (ref 1.2–7.7)
NEUTROPHILS NFR BLD AUTO: 65.9 %
NRBC BLD-RTO: 0 /100 WBCS (ref 0–0)
PLATELET # BLD AUTO: 202 X10*3/UL (ref 150–450)
POTASSIUM SERPL-SCNC: 4.2 MMOL/L (ref 3.5–5.3)
PROT SERPL-MCNC: 7 G/DL (ref 6.4–8.2)
RBC # BLD AUTO: 5.4 X10*6/UL (ref 4.5–5.9)
SARS-COV-2 RNA RESP QL NAA+PROBE: NOT DETECTED
SODIUM SERPL-SCNC: 141 MMOL/L (ref 136–145)
WBC # BLD AUTO: 7.4 X10*3/UL (ref 4.4–11.3)

## 2024-09-15 PROCEDURE — 71045 X-RAY EXAM CHEST 1 VIEW: CPT | Mod: FOREIGN READ | Performed by: RADIOLOGY

## 2024-09-15 PROCEDURE — 36415 COLL VENOUS BLD VENIPUNCTURE: CPT | Performed by: EMERGENCY MEDICINE

## 2024-09-15 PROCEDURE — 93005 ELECTROCARDIOGRAM TRACING: CPT

## 2024-09-15 PROCEDURE — 84484 ASSAY OF TROPONIN QUANT: CPT | Performed by: EMERGENCY MEDICINE

## 2024-09-15 PROCEDURE — 96374 THER/PROPH/DIAG INJ IV PUSH: CPT

## 2024-09-15 PROCEDURE — 99284 EMERGENCY DEPT VISIT MOD MDM: CPT | Mod: 25

## 2024-09-15 PROCEDURE — 80053 COMPREHEN METABOLIC PANEL: CPT | Performed by: EMERGENCY MEDICINE

## 2024-09-15 PROCEDURE — 2500000004 HC RX 250 GENERAL PHARMACY W/ HCPCS (ALT 636 FOR OP/ED): Performed by: EMERGENCY MEDICINE

## 2024-09-15 PROCEDURE — 71045 X-RAY EXAM CHEST 1 VIEW: CPT

## 2024-09-15 PROCEDURE — 87636 SARSCOV2 & INF A&B AMP PRB: CPT | Performed by: EMERGENCY MEDICINE

## 2024-09-15 PROCEDURE — 83735 ASSAY OF MAGNESIUM: CPT | Performed by: EMERGENCY MEDICINE

## 2024-09-15 PROCEDURE — 85025 COMPLETE CBC W/AUTO DIFF WBC: CPT | Performed by: EMERGENCY MEDICINE

## 2024-09-15 RX ORDER — PREDNISONE 20 MG/1
60 TABLET ORAL ONCE
Status: COMPLETED | OUTPATIENT
Start: 2024-09-15 | End: 2024-09-15

## 2024-09-15 RX ORDER — PREDNISONE 20 MG/1
40 TABLET ORAL DAILY
Qty: 10 TABLET | Refills: 0 | Status: SHIPPED | OUTPATIENT
Start: 2024-09-15 | End: 2024-09-20

## 2024-09-15 RX ORDER — KETOROLAC TROMETHAMINE 30 MG/ML
15 INJECTION, SOLUTION INTRAMUSCULAR; INTRAVENOUS ONCE
Status: COMPLETED | OUTPATIENT
Start: 2024-09-15 | End: 2024-09-15

## 2024-09-15 RX ORDER — KETOROLAC TROMETHAMINE 10 MG/1
10 TABLET, FILM COATED ORAL EVERY 6 HOURS PRN
Qty: 20 TABLET | Refills: 0 | Status: SHIPPED | OUTPATIENT
Start: 2024-09-15 | End: 2024-09-20

## 2024-09-15 ASSESSMENT — PAIN - FUNCTIONAL ASSESSMENT: PAIN_FUNCTIONAL_ASSESSMENT: 0-10

## 2024-09-15 ASSESSMENT — PAIN SCALES - GENERAL
PAINLEVEL_OUTOF10: 6

## 2024-09-15 ASSESSMENT — PAIN DESCRIPTION - DESCRIPTORS
DESCRIPTORS: SHARP

## 2024-09-15 ASSESSMENT — HEART SCORE
TROPONIN: LESS THAN OR EQUAL TO NORMAL LIMIT
RISK FACTORS: 1-2 RISK FACTORS
ECG: NORMAL
AGE: <45
HISTORY: SLIGHTLY SUSPICIOUS
HEART SCORE: 1

## 2024-09-15 ASSESSMENT — PAIN DESCRIPTION - PAIN TYPE: TYPE: ACUTE PAIN

## 2024-09-15 ASSESSMENT — PAIN DESCRIPTION - LOCATION: LOCATION: CHEST

## 2024-09-15 NOTE — Clinical Note
Yevgeniy Cisneros was seen and treated in our emergency department on 9/15/2024.  He may return to work on 09/17/2024.       If you have any questions or concerns, please don't hesitate to call.      Xu Andrade, DO

## 2024-09-15 NOTE — ED PROVIDER NOTES
HPI   Chief Complaint   Patient presents with    Chest Pain     Lefty sided chest pain since last night. Reports pain is on his upper chest and below his breast. Pain is worse with movement. Reports pain is sharp and has been seen numerous times for the same, with no findings. EKG done at .       Limitations to History: None    HPI: 24-year-old male presents with concern for chest pain.  Left-sided.  Began 2 years ago after lifting injury.  Patient states that intermittently he will exacerbate this injury.  Sharp pain.  Radiates to his left posterior shoulder.  Worse with movement.  Denies any nausea, vomiting, shortness of breath, diaphoresis.  Denies any reinjury, fall, trauma.    Additional History Obtained from: Wife at the bedside.    ------------------------------------------------------------------------------------------------------------------------------------------  Physical Exam:    VS: As documented in the triage note and EMR flowsheet from this visit were reviewed.    Appearance: Alert. cooperative,  in no acute distress.   Skin: Intact,  dry skin, no lesions, rash, petechiae or purpura.   HENT: Normocephalic, atraumatic. Nares patent. No intraoral lesions.   Neck: Supple, without meningismus. Trachea at midline. No lymphadenopathy.  Pulmonary: Clear bilaterally with good chest wall excursion. No rales, rhonchi or wheezing. No accessory muscle use or stridor.  Cardiac: Regular rate and rhythm, no rubs, murmurs, or gallops.   Abdomen: Abdomen is soft, nontender, and nondistended.  No palpable organomegaly.  No rebound or guarding.  No CVA tenderness. Nonsurgical abdomen.  Genitourinary: Exam deferred.  Musculoskeletal: Full range of motion.  Pulses full and equal. No cyanosis, clubbing, or edema.  Psychiatric: Appropriate mood and affect.                Patient History   Past Medical History:   Diagnosis Date    Hypertension      No past surgical history on file.  No family history on file.  Social  History     Tobacco Use    Smoking status: Former     Types: Cigarettes    Smokeless tobacco: Never   Substance Use Topics    Alcohol use: Not Currently    Drug use: Not Currently       Physical Exam   ED Triage Vitals [09/15/24 1234]   Temperature Heart Rate Respirations BP   36.8 °C (98.2 °F) 76 16 (!) 156/94      Pulse Ox Temp Source Heart Rate Source Patient Position   99 % Temporal Monitor Sitting      BP Location FiO2 (%)     Left arm --       Physical Exam      ED Course & MDM   Diagnoses as of 09/15/24 1416   Chest wall pain                 No data recorded     Cole Coma Scale Score: 15 (09/15/24 1238 : Kristine Wong RN)                           Medical Decision Making  Labs Reviewed  COMPREHENSIVE METABOLIC PANEL - Normal     Glucose                       86                     Sodium                        141                    Potassium                     4.2                    Chloride                      106                    Bicarbonate                   26                     Anion Gap                     13                     Urea Nitrogen                 14                     Creatinine                    0.95                   eGFR                          >90                    Calcium                       10.1                   Albumin                       4.9                    Alkaline Phosphatase          74                     Total Protein                 7.0                    AST                           26                     Bilirubin, Total              0.9                    ALT                           24                  MAGNESIUM - Normal     Magnesium                     2.09                SERIAL TROPONIN-INITIAL - Normal     Troponin I, High Sensitivity   4                          Narrative: Less than 99th percentile of normal range cutoff-                  Female and children under 18 years old <14 ng/L; Male <21 ng/L: Negative                  Repeat testing  should be performed if clinically indicated.                                     Female and children under 18 years old 14-50 ng/L; Male 21-50 ng/L:                  Consistent with possible cardiac damage and possible increased clinical                   risk. Serial measurements may help to assess extent of myocardial damage.                                     >50 ng/L: Consistent with cardiac damage, increased clinical risk and                  myocardial infarction. Serial measurements may help assess extent of                   myocardial damage.                                      NOTE: Children less than 1 year old may have higher baseline troponin                   levels and results should be interpreted in conjunction with the overall                   clinical context.                                     NOTE: Troponin I testing is performed using a different                   testing methodology at [facility] than at other                   [facility]. Direct result comparisons should only                   be made within the same method.  SERIAL TROPONIN, 1 HOUR - Normal     Troponin I, High Sensitivity   4                          Narrative: Less than 99th percentile of normal range cutoff-                  Female and children under 18 years old <14 ng/L; Male <21 ng/L: Negative                  Repeat testing should be performed if clinically indicated.                                     Female and children under 18 years old 14-50 ng/L; Male 21-50 ng/L:                  Consistent with possible cardiac damage and possible increased clinical                   risk. Serial measurements may help to assess extent of myocardial damage.                                     >50 ng/L: Consistent with cardiac damage, increased clinical risk and                  myocardial infarction. Serial measurements may help assess extent of                   myocardial damage.                                       NOTE: Children less than 1 year old may have higher baseline troponin                   levels and results should be interpreted in conjunction with the overall                   clinical context.                                     NOTE: Troponin I testing is performed using a different                   testing methodology at [facility] than at other                   [facility]. Direct result comparisons should only                   be made within the same method.  INFLUENZA A AND B PCR - Normal     Flu A Result                                         Flu B Result                                             Narrative: This assay is an in vitro diagnostic multiplex nucleic acid amplification test for the detection and discrimination of Influenza A & B from nasopharyngeal specimens, and has been validated for use at [facility]. Negative results do not preclude Influenza A/B infections, and should not be used as the sole basis for diagnosis, treatment, or other management decisions. If Influenza A/B and RSV PCR results are negative, testing for Parainfluenza virus, Adenovirus and Metapneumovirus is routinely performed for [facility] pediatric oncology and intensive care inpatients, and is available on other patients by placing an add-on request.  SARS-COV-2 PCR - Normal     Coronavirus 2019, PCR                                    Narrative: This assay has received FDA Emergency Use Authorization (EUA) and is only authorized for the duration of time that circumstances exist to justify the authorization of the emergency use of in vitro diagnostic tests for the detection of SARS-CoV-2 virus and/or diagnosis of COVID-19 infection under section 564(b)(1) of the Act, 21 U.S.C. 360bbb-3(b)(1). This assay is an in vitro diagnostic nucleic acid amplification test for the qualitative detection of SARS-CoV-2 from nasopharyngeal specimens and has been validated for use at  [facility]. Negative results do not preclude COVID-19 infections and should not be used as the sole basis for diagnosis, treatment, or other management decisions.                    TROPONIN SERIES- (INITIAL, 1 HR)         Narrative: The following orders were created for panel order Troponin I Series, High Sensitivity (0, 1 HR).                  Procedure                               Abnormality         Status                                     ---------                               -----------         ------                                     Troponin I, High Sensiti...[[id number]]  Normal              Final result                               Troponin, High Sensitivi...[[id number]]  Normal              Final result                                                 Please view results for these tests on the individual orders.  CBC WITH AUTO DIFFERENTIAL  XR chest 1 view    (Results Pending)  Medical Decision Making:    Patient appears well nontoxic.  Lab work unremarkable including 2 negative high-sensitivity troponins.  Chest x-ray clear.  EKG nonischemic.  Patient treated with intravenous Toradol and oral prednisone.  Patient will be treated with concern for musculoskeletal injury.  Will be given oral prednisone and ketorolac for home.  Given the chronicity and continued issues with this injury patient will be given orthopedic follow-up as well as primary care follow-up.  Asked return for new or worsening symptoms.  Stable at time of discharge.    Differential Diagnoses Considered: Musculoskeletal chest pain, ACS, pneumonia, pneumothorax    Independent Interpretation of Studies:  I independently interpreted: Chest x-ray shows no evidence of pneumonia or pneumothorax.    Escalation of Care:  Appropriate for discharge and follow-up with primary care.    Prescription Drug Consideration: Oral ketorolac and prednisone.          Procedure  ECG 12 lead    Performed by: Xu Andrade,  DO  Authorized by: Xu Bermudez DO    ECG interpreted by ED Physician in the absence of a cardiologist: yes    Comments:      EKG interpreted by Dr. Xu Bermudez: Sinus bradycardia 59 bpm.  MS interval 150 ms.  QTc of 388 ms.       Xu Bermudez DO  09/15/24 1410

## 2024-09-18 ENCOUNTER — APPOINTMENT (OUTPATIENT)
Dept: ORTHOPEDIC SURGERY | Facility: CLINIC | Age: 24
End: 2024-09-18
Payer: COMMERCIAL

## 2024-09-19 LAB
ATRIAL RATE: 59 BPM
P AXIS: 68 DEGREES
P OFFSET: 201 MS
P ONSET: 143 MS
PR INTERVAL: 150 MS
Q ONSET: 218 MS
QRS COUNT: 10 BEATS
QRS DURATION: 84 MS
QT INTERVAL: 392 MS
QTC CALCULATION(BAZETT): 388 MS
QTC FREDERICIA: 390 MS
R AXIS: 85 DEGREES
T AXIS: 79 DEGREES
T OFFSET: 414 MS
VENTRICULAR RATE: 59 BPM

## 2024-10-02 ENCOUNTER — HOSPITAL ENCOUNTER (EMERGENCY)
Facility: HOSPITAL | Age: 24
Discharge: HOME | End: 2024-10-02
Attending: EMERGENCY MEDICINE
Payer: COMMERCIAL

## 2024-10-02 VITALS
DIASTOLIC BLOOD PRESSURE: 68 MMHG | HEART RATE: 59 BPM | HEIGHT: 67 IN | TEMPERATURE: 98 F | BODY MASS INDEX: 35.79 KG/M2 | WEIGHT: 228 LBS | RESPIRATION RATE: 17 BRPM | SYSTOLIC BLOOD PRESSURE: 143 MMHG | OXYGEN SATURATION: 98 %

## 2024-10-02 DIAGNOSIS — L73.9 FOLLICULITIS: Primary | ICD-10-CM

## 2024-10-02 PROCEDURE — 99283 EMERGENCY DEPT VISIT LOW MDM: CPT

## 2024-10-02 RX ORDER — DOXYCYCLINE 100 MG/1
100 TABLET ORAL 2 TIMES DAILY
Qty: 14 TABLET | Refills: 0 | Status: SHIPPED | OUTPATIENT
Start: 2024-10-02 | End: 2024-10-09

## 2024-10-02 ASSESSMENT — PAIN - FUNCTIONAL ASSESSMENT: PAIN_FUNCTIONAL_ASSESSMENT: 0-10

## 2024-10-02 ASSESSMENT — PAIN SCALES - GENERAL: PAINLEVEL_OUTOF10: 0 - NO PAIN

## 2024-10-02 NOTE — ED PROVIDER NOTES
HPI   Chief Complaint   Patient presents with    Groin Pain     Right inguinal fold bruise.No pain. Pt denies injury.       Limitations to History: None    HPI: 24-year-old male presents with lesion to his right lower abdomen.  Noticed this morning.  Aching nature.  Denies any fall or trauma.  Denies any fever, chills, nausea, vomiting, urinary symptoms.    ------------------------------------------------------------------------------------------------------------------------------------------  Physical Exam:    VS: As documented in the triage note and EMR flowsheet from this visit were reviewed.    Appearance: Alert. cooperative,  in no acute distress.   Skin: Small indurated and ecchymotic area to the right lower abdomen.  No surrounding cellulitis.  Eyes: PERRLA, EOMs intact,  Conjunctiva pink with no redness or exudates.   HENT: Normocephalic, atraumatic.   Abdomen: Abdomen is soft, nontender, and nondistended.    Psychiatric: Appropriate mood and affect.                Patient History   Past Medical History:   Diagnosis Date    Hypertension      History reviewed. No pertinent surgical history.  No family history on file.  Social History     Tobacco Use    Smoking status: Former     Types: Cigarettes    Smokeless tobacco: Never   Substance Use Topics    Alcohol use: Not Currently    Drug use: Not Currently       Physical Exam   ED Triage Vitals [10/02/24 0749]   Temperature Heart Rate Respirations BP   36.7 °C (98 °F) 59 17 143/68      Pulse Ox Temp Source Heart Rate Source Patient Position   98 % Temporal -- --      BP Location FiO2 (%)     -- --       Physical Exam      ED Course & MDM   Diagnoses as of 10/02/24 0823   Folliculitis                 No data recorded     Cole Coma Scale Score: 15 (10/02/24 0752 : Favio Jesus RN)                           Medical Decision Making  Medical Decision Making:    Patient appears well nontoxic.  Vital signs within normal limits.  No evidence of cellulitis.   Swollen indurated without fluctuance.  Likely folliculitis versus forming abscess.  Patient be treated with doxycycline.  Advised on Motrin and Tylenol.  Asked to return for new or worsening symptoms.  Stable time of discharge.    Differential Diagnoses Considered: Folliculitis, abscess, cellulitis    Escalation of Care:  Appropriate for discharge and follow-up with primary care.    Prescription Drug Consideration: Oral doxycycline.            Procedure  Procedures     Xu Andrade DO  10/02/24 0824

## 2024-10-02 NOTE — Clinical Note
Yevgeniy Cisneros was seen and treated in our emergency department on 10/2/2024.  He may return to work on 10/03/2024.       If you have any questions or concerns, please don't hesitate to call.      Xu Andrade, DO

## 2025-02-19 ENCOUNTER — HOSPITAL ENCOUNTER (EMERGENCY)
Facility: HOSPITAL | Age: 25
Discharge: HOME | End: 2025-02-19
Payer: COMMERCIAL

## 2025-02-19 ENCOUNTER — APPOINTMENT (OUTPATIENT)
Dept: RADIOLOGY | Facility: HOSPITAL | Age: 25
End: 2025-02-19
Payer: COMMERCIAL

## 2025-02-19 VITALS
HEIGHT: 67 IN | RESPIRATION RATE: 20 BRPM | SYSTOLIC BLOOD PRESSURE: 131 MMHG | OXYGEN SATURATION: 97 % | BODY MASS INDEX: 39.24 KG/M2 | WEIGHT: 250 LBS | TEMPERATURE: 98.1 F | DIASTOLIC BLOOD PRESSURE: 76 MMHG | HEART RATE: 57 BPM

## 2025-02-19 DIAGNOSIS — K52.9 COLITIS: Primary | ICD-10-CM

## 2025-02-19 DIAGNOSIS — R10.9 LEFT SIDED ABDOMINAL PAIN: ICD-10-CM

## 2025-02-19 DIAGNOSIS — R16.1 SPLENOMEGALY: ICD-10-CM

## 2025-02-19 LAB
ALBUMIN SERPL BCP-MCNC: 5.1 G/DL (ref 3.4–5)
ALP SERPL-CCNC: 69 U/L (ref 33–120)
ALT SERPL W P-5'-P-CCNC: 36 U/L (ref 10–52)
ANION GAP SERPL CALC-SCNC: 10 MMOL/L (ref 10–20)
APPEARANCE UR: CLEAR
AST SERPL W P-5'-P-CCNC: 24 U/L (ref 9–39)
BASOPHILS # BLD AUTO: 0.05 X10*3/UL (ref 0–0.1)
BASOPHILS NFR BLD AUTO: 0.7 %
BILIRUB DIRECT SERPL-MCNC: 0.1 MG/DL (ref 0–0.3)
BILIRUB SERPL-MCNC: 0.5 MG/DL (ref 0–1.2)
BILIRUB UR STRIP.AUTO-MCNC: NEGATIVE MG/DL
BUN SERPL-MCNC: 13 MG/DL (ref 6–23)
CALCIUM SERPL-MCNC: 9.7 MG/DL (ref 8.6–10.3)
CHLORIDE SERPL-SCNC: 106 MMOL/L (ref 98–107)
CO2 SERPL-SCNC: 27 MMOL/L (ref 21–32)
COLOR UR: NORMAL
CREAT SERPL-MCNC: 0.91 MG/DL (ref 0.5–1.3)
EGFRCR SERPLBLD CKD-EPI 2021: >90 ML/MIN/1.73M*2
EOSINOPHIL # BLD AUTO: 0.39 X10*3/UL (ref 0–0.7)
EOSINOPHIL NFR BLD AUTO: 5.6 %
ERYTHROCYTE [DISTWIDTH] IN BLOOD BY AUTOMATED COUNT: 12.3 % (ref 11.5–14.5)
FLUAV RNA RESP QL NAA+PROBE: NOT DETECTED
FLUBV RNA RESP QL NAA+PROBE: NOT DETECTED
GLUCOSE SERPL-MCNC: 83 MG/DL (ref 74–99)
GLUCOSE UR STRIP.AUTO-MCNC: NORMAL MG/DL
HCT VFR BLD AUTO: 47.9 % (ref 41–52)
HGB BLD-MCNC: 16.7 G/DL (ref 13.5–17.5)
HOLD SPECIMEN: NORMAL
IMM GRANULOCYTES # BLD AUTO: 0.02 X10*3/UL (ref 0–0.7)
IMM GRANULOCYTES NFR BLD AUTO: 0.3 % (ref 0–0.9)
KETONES UR STRIP.AUTO-MCNC: NEGATIVE MG/DL
LEUKOCYTE ESTERASE UR QL STRIP.AUTO: NEGATIVE
LIPASE SERPL-CCNC: 39 U/L (ref 9–82)
LYMPHOCYTES # BLD AUTO: 2.49 X10*3/UL (ref 1.2–4.8)
LYMPHOCYTES NFR BLD AUTO: 35.7 %
MAGNESIUM SERPL-MCNC: 2.06 MG/DL (ref 1.6–2.4)
MCH RBC QN AUTO: 28.4 PG (ref 26–34)
MCHC RBC AUTO-ENTMCNC: 34.9 G/DL (ref 32–36)
MCV RBC AUTO: 82 FL (ref 80–100)
MONOCYTES # BLD AUTO: 0.45 X10*3/UL (ref 0.1–1)
MONOCYTES NFR BLD AUTO: 6.4 %
NEUTROPHILS # BLD AUTO: 3.58 X10*3/UL (ref 1.2–7.7)
NEUTROPHILS NFR BLD AUTO: 51.3 %
NITRITE UR QL STRIP.AUTO: NEGATIVE
NRBC BLD-RTO: 0 /100 WBCS (ref 0–0)
PH UR STRIP.AUTO: 6 [PH]
PLATELET # BLD AUTO: 196 X10*3/UL (ref 150–450)
POTASSIUM SERPL-SCNC: 3.4 MMOL/L (ref 3.5–5.3)
PROT SERPL-MCNC: 7.6 G/DL (ref 6.4–8.2)
PROT UR STRIP.AUTO-MCNC: NEGATIVE MG/DL
RBC # BLD AUTO: 5.88 X10*6/UL (ref 4.5–5.9)
RBC # UR STRIP.AUTO: NEGATIVE MG/DL
SARS-COV-2 RNA RESP QL NAA+PROBE: NOT DETECTED
SODIUM SERPL-SCNC: 140 MMOL/L (ref 136–145)
SP GR UR STRIP.AUTO: 1.02
UROBILINOGEN UR STRIP.AUTO-MCNC: NORMAL MG/DL
WBC # BLD AUTO: 7 X10*3/UL (ref 4.4–11.3)

## 2025-02-19 PROCEDURE — 87636 SARSCOV2 & INF A&B AMP PRB: CPT | Performed by: PHYSICIAN ASSISTANT

## 2025-02-19 PROCEDURE — 99285 EMERGENCY DEPT VISIT HI MDM: CPT | Mod: 25

## 2025-02-19 PROCEDURE — 36415 COLL VENOUS BLD VENIPUNCTURE: CPT | Performed by: EMERGENCY MEDICINE

## 2025-02-19 PROCEDURE — 81003 URINALYSIS AUTO W/O SCOPE: CPT | Performed by: PHYSICIAN ASSISTANT

## 2025-02-19 PROCEDURE — 85025 COMPLETE CBC W/AUTO DIFF WBC: CPT | Performed by: PHYSICIAN ASSISTANT

## 2025-02-19 PROCEDURE — 74177 CT ABD & PELVIS W/CONTRAST: CPT | Performed by: STUDENT IN AN ORGANIZED HEALTH CARE EDUCATION/TRAINING PROGRAM

## 2025-02-19 PROCEDURE — 2550000001 HC RX 255 CONTRASTS: Performed by: PHYSICIAN ASSISTANT

## 2025-02-19 PROCEDURE — 96360 HYDRATION IV INFUSION INIT: CPT | Mod: 59

## 2025-02-19 PROCEDURE — 74177 CT ABD & PELVIS W/CONTRAST: CPT

## 2025-02-19 PROCEDURE — 83690 ASSAY OF LIPASE: CPT | Performed by: PHYSICIAN ASSISTANT

## 2025-02-19 PROCEDURE — 2500000004 HC RX 250 GENERAL PHARMACY W/ HCPCS (ALT 636 FOR OP/ED): Performed by: PHYSICIAN ASSISTANT

## 2025-02-19 PROCEDURE — 80048 BASIC METABOLIC PNL TOTAL CA: CPT | Performed by: PHYSICIAN ASSISTANT

## 2025-02-19 PROCEDURE — 83735 ASSAY OF MAGNESIUM: CPT | Performed by: PHYSICIAN ASSISTANT

## 2025-02-19 PROCEDURE — 82248 BILIRUBIN DIRECT: CPT | Performed by: PHYSICIAN ASSISTANT

## 2025-02-19 RX ORDER — ONDANSETRON 4 MG/1
4 TABLET, ORALLY DISINTEGRATING ORAL EVERY 8 HOURS PRN
Qty: 9 TABLET | Refills: 0 | Status: SHIPPED | OUTPATIENT
Start: 2025-02-19 | End: 2025-02-22

## 2025-02-19 RX ADMIN — IOHEXOL 65 ML: 350 INJECTION, SOLUTION INTRAVENOUS at 18:48

## 2025-02-19 RX ADMIN — SODIUM CHLORIDE 1000 ML: 9 INJECTION, SOLUTION INTRAVENOUS at 17:46

## 2025-02-19 ASSESSMENT — PAIN DESCRIPTION - ORIENTATION: ORIENTATION: LEFT

## 2025-02-19 ASSESSMENT — ENCOUNTER SYMPTOMS
COUGH: 0
CHILLS: 0
EYE PAIN: 0
BACK PAIN: 0
PALPITATIONS: 0
ARTHRALGIAS: 0
ABDOMINAL PAIN: 1
SEIZURES: 0
SORE THROAT: 0
FEVER: 0
VOMITING: 0
COLOR CHANGE: 0
HEMATURIA: 0
NAUSEA: 0
DIARRHEA: 1
DYSURIA: 0
SHORTNESS OF BREATH: 0

## 2025-02-19 ASSESSMENT — PAIN SCALES - GENERAL: PAINLEVEL_OUTOF10: 5 - MODERATE PAIN

## 2025-02-19 ASSESSMENT — PAIN - FUNCTIONAL ASSESSMENT: PAIN_FUNCTIONAL_ASSESSMENT: 0-10

## 2025-02-19 ASSESSMENT — PAIN DESCRIPTION - LOCATION: LOCATION: ABDOMEN

## 2025-02-19 NOTE — Clinical Note
Yevgeniy Cisneros was seen and treated in our emergency department on 2/19/2025.  He may return to work on 02/22/2025.       If you have any questions or concerns, please don't hesitate to call.      Quita Kay PA-C

## 2025-02-19 NOTE — ED PROVIDER NOTES
Patient is a 24-year-old male who presents to the emergency room with a chief complaint of left-sided abdominal/flank pain.  He states that his symptoms started about 3 days ago.  He developed diarrhea yesterday.  He states that he has had approximately 8-10 episodes.  He states that he feels dehydrated.  He denies any chest pain or shortness of breath.  No fever or chills.  He denies any nausea or vomiting.  He states that his pain seems somewhat more intense prior to coming to the emergency department but he does feel better.           Review of Systems   Constitutional:  Negative for chills and fever.   HENT:  Negative for ear pain and sore throat.    Eyes:  Negative for pain and visual disturbance.   Respiratory:  Negative for cough and shortness of breath.    Cardiovascular:  Negative for chest pain and palpitations.   Gastrointestinal:  Positive for abdominal pain and diarrhea. Negative for nausea and vomiting.   Genitourinary:  Negative for dysuria and hematuria.   Musculoskeletal:  Negative for arthralgias and back pain.   Skin:  Negative for color change and rash.   Neurological:  Negative for seizures and syncope.   All other systems reviewed and are negative.       Physical Exam  Vitals and nursing note reviewed.   Constitutional:       General: He is not in acute distress.     Appearance: He is well-developed.   HENT:      Head: Normocephalic and atraumatic.   Eyes:      Conjunctiva/sclera: Conjunctivae normal.   Cardiovascular:      Rate and Rhythm: Normal rate and regular rhythm.      Heart sounds: No murmur heard.  Pulmonary:      Effort: Pulmonary effort is normal. No respiratory distress.      Breath sounds: Normal breath sounds. No stridor. No wheezing, rhonchi or rales.   Chest:      Chest wall: No tenderness.   Abdominal:      General: Abdomen is flat.      Palpations: Abdomen is soft.      Tenderness: There is abdominal tenderness (mild) in the left upper quadrant and left lower quadrant. There  is no right CVA tenderness, left CVA tenderness, guarding or rebound. Negative signs include Ritchie's sign and McBurney's sign.      Hernia: No hernia is present.   Musculoskeletal:         General: No swelling.      Cervical back: Neck supple.   Skin:     General: Skin is warm and dry.      Capillary Refill: Capillary refill takes less than 2 seconds.   Neurological:      Mental Status: He is alert.   Psychiatric:         Mood and Affect: Mood normal.          Labs Reviewed   BASIC METABOLIC PANEL - Abnormal       Result Value    Glucose 83      Sodium 140      Potassium 3.4 (*)     Chloride 106      Bicarbonate 27      Anion Gap 10      Urea Nitrogen 13      Creatinine 0.91      eGFR >90      Calcium 9.7     HEPATIC FUNCTION PANEL - Abnormal    Albumin 5.1 (*)     Bilirubin, Total 0.5      Bilirubin, Direct 0.1      Alkaline Phosphatase 69      ALT 36      AST 24      Total Protein 7.6     MAGNESIUM - Normal    Magnesium 2.06     LIPASE - Normal    Lipase 39      Narrative:     Venipuncture immediately after or during the administration of Metamizole may lead to falsely low results. Testing should be performed immediately prior to Metamizole dosing.   SARS-COV-2 AND INFLUENZA A/B PCR - Normal    Flu A Result Not Detected      Flu B Result Not Detected      Coronavirus 2019, PCR Not Detected      Narrative:     This assay is an FDA-cleared, in vitro diagnostic nucleic acid amplification test for the qualitative detection and differentiation of SARS CoV-2/ Influenza A/B from nasopharyngeal specimens collected from individuals with signs and symptoms of respiratory tract infections, and has been validated for use at [facility]. Negative results do not preclude COVID-19/ Influenza A/B infections and should not be used as the sole basis for diagnosis, treatment, or other management decisions. Testing for SARS CoV-2 is recommended only for patients who meet current clinical and/or epidemiological  criteria defined by federal, state, or local public health directives.   URINALYSIS WITH REFLEX CULTURE AND MICROSCOPIC - Normal    Color, Urine Light-Yellow      Appearance, Urine Clear      Specific Gravity, Urine 1.019      pH, Urine 6.0      Protein, Urine NEGATIVE      Glucose, Urine Normal      Blood, Urine NEGATIVE      Ketones, Urine NEGATIVE      Bilirubin, Urine NEGATIVE      Urobilinogen, Urine Normal      Nitrite, Urine NEGATIVE      Leukocyte Esterase, Urine NEGATIVE     CBC WITH AUTO DIFFERENTIAL    WBC 7.0      nRBC 0.0      RBC 5.88      Hemoglobin 16.7      Hematocrit 47.9      MCV 82      MCH 28.4      MCHC 34.9      RDW 12.3      Platelets 196      Neutrophils % 51.3      Immature Granulocytes %, Automated 0.3      Lymphocytes % 35.7      Monocytes % 6.4      Eosinophils % 5.6      Basophils % 0.7      Neutrophils Absolute 3.58      Immature Granulocytes Absolute, Automated 0.02      Lymphocytes Absolute 2.49      Monocytes Absolute 0.45      Eosinophils Absolute 0.39      Basophils Absolute 0.05     GRAY TOP    Extra Tube Hold for add-ons.     URINALYSIS WITH REFLEX CULTURE AND MICROSCOPIC    Narrative:     The following orders were created for panel order Urinalysis with Reflex Culture and Microscopic.  Procedure                               Abnormality         Status                     ---------                               -----------         ------                     Urinalysis with Reflex C...[[id number]]  Normal              Final result               Extra Urine Gray Tube[[id number]]                            In process                   Please view results for these tests on the individual orders.   EXTRA URINE GRAY TUBE        CT abdomen pelvis w IV contrast   Final Result   1. Descending and sigmoid colon are decompressed and slightly   hyperemic, possibly representing mild nonspecific colitis, without   significant inflammatory wall thickening. Otherwise, no significant    abnormality is identified in the abdomen and pelvis.   2. Mild splenomegaly, with the spleen measuring up to 15 cm in   craniocaudal dimension.        MACRO:   None.        Signed by: Jade Forrest 2/19/2025 7:04 PM   Dictation workstation:   [id number]           Procedures     Medical Decision Making  Patient is a 24-year-old male who presents to the emergency department with a chief complaint of left-sided abdominal pain with onset 3 days ago.  He also reported associated diarrhea approximately 8-10 episodes in which she feels dehydrated.  He denies any chest pain or shortness of breath.  No fever or chills.  He originally denied nausea or vomiting but occasionally he has nauseous.  CT scan of the abdomen pelvis shows findings consistent with likely colitis.  There is also splenomegaly noted that is mild, patient made aware of this finding and instructed to follow-up with his primary care provider for further workup.  Instructed him to return for any new or worsening symptoms.      Differential diagnosis includes but not limited to COVID, influenza, viral gastroenteritis, colitis, diverticulitis    Amount and/or Complexity of Data Reviewed  Labs: ordered. Decision-making details documented in ED Course.  Radiology: ordered and independent interpretation performed. Decision-making details documented in ED Course.         Diagnoses as of 02/19/25 1951   Colitis   Splenomegaly   Left sided abdominal pain                    Quita Kay PA-C  02/19/25 1951       Quita Kay PA-C  02/19/25 1951

## 2025-02-20 LAB — HOLD SPECIMEN: NORMAL

## 2025-06-02 ENCOUNTER — HOSPITAL ENCOUNTER (EMERGENCY)
Facility: HOSPITAL | Age: 25
Discharge: HOME | End: 2025-06-02
Attending: EMERGENCY MEDICINE
Payer: COMMERCIAL

## 2025-06-02 VITALS
DIASTOLIC BLOOD PRESSURE: 89 MMHG | SYSTOLIC BLOOD PRESSURE: 132 MMHG | BODY MASS INDEX: 40.81 KG/M2 | HEART RATE: 60 BPM | OXYGEN SATURATION: 100 % | HEIGHT: 67 IN | RESPIRATION RATE: 16 BRPM | TEMPERATURE: 97.6 F | WEIGHT: 260 LBS

## 2025-06-02 DIAGNOSIS — R21 RASH: Primary | ICD-10-CM

## 2025-06-02 PROCEDURE — 99282 EMERGENCY DEPT VISIT SF MDM: CPT | Performed by: EMERGENCY MEDICINE

## 2025-06-02 RX ORDER — HYDROCORTISONE 1 %
1 CREAM (GRAM) TOPICAL 2 TIMES DAILY
Qty: 6 G | Refills: 0 | Status: SHIPPED | OUTPATIENT
Start: 2025-06-02 | End: 2025-07-02

## 2025-06-02 ASSESSMENT — PAIN - FUNCTIONAL ASSESSMENT: PAIN_FUNCTIONAL_ASSESSMENT: 0-10

## 2025-06-02 ASSESSMENT — PAIN SCALES - GENERAL
PAINLEVEL_OUTOF10: 0 - NO PAIN
PAINLEVEL_OUTOF10: 0 - NO PAIN

## 2025-06-02 NOTE — ED PROVIDER NOTES
HPI   Chief Complaint   Patient presents with    Rash     Pt states he woke up this morning with red, raised, splotchy rashes under his left arm, on his right side and on his buttocks. Denies pain, itchiness, or fever.       Patient presents secondary to a rash.  He states he woke up with this dermatitis this morning.  He states the area is not painful or pruritic.  No known inciting factors.  The medical record was reviewed and this contributed directly to patient care.  The patient was seen at an outside facility in March of this year for a rash however he states that that was quite different in the EMR states he was diagnosed with folliculitis at that time.      History provided by:  Patient   used: No            Patient History   Medical History[1]  Surgical History[2]  Family History[3]  Social History[4]    Physical Exam   ED Triage Vitals [06/02/25 0652]   Temperature Heart Rate Respirations BP   36.4 °C (97.6 °F) 63 16 148/86      Pulse Ox Temp Source Heart Rate Source Patient Position   99 % Temporal Monitor Lying      BP Location FiO2 (%)     Left arm --       Physical Exam  Vitals and nursing note reviewed.   Constitutional:       General: He is not in acute distress.     Appearance: Normal appearance. He is obese. He is not ill-appearing, toxic-appearing or diaphoretic.   HENT:      Head: Normocephalic and atraumatic.      Nose: Nose normal. No rhinorrhea.   Neck:      Comments: Trachea is midline  Abdominal:      General: Bowel sounds are normal.   Musculoskeletal:         General: Normal range of motion.      Cervical back: Normal range of motion.   Skin:     General: Skin is warm and dry.      Findings: Rash present.      Comments: Patient has scattered, easily blanchable, erythematous macular lesions that range from 1/2 cm to 1 cm in size.  They are ovoid in shape.  They are located over the torso and left upper extremity on either side of the axillary crease.  There is no  blistering lesions.  Nothing weeping that would be consistent with impetigo.  He also has similar scattered lesions farther down onto the left flank and just above the waistline to the posterior torso on the left.  These are not hot to the touch.  They are not painful.   Neurological:      General: No focal deficit present.      Mental Status: He is alert and oriented to person, place, and time. Mental status is at baseline.   Psychiatric:         Mood and Affect: Mood normal.         Behavior: Behavior normal.         Thought Content: Thought content normal.         Judgment: Judgment normal.           ED Course & MDM   Diagnoses as of 06/02/25 0722   Rash                 No data recorded     [place] Coma Scale Score: 15 (06/02/25 0654 : Vy Bertrand RN)                           Medical Decision Making  Differential considerations would include, but not limited to, contact dermatitis, insect bites, early impetigo, amongst many others.  At this point the rash is not painful or pruritic.  It does not appear to be a scabies infestation.  It is not consistent with tinea.  These are not petechial lesions.  Patient will be instructed to follow-up in 48 hours with his private physician so this can be reinspected and he will be prescribed a hydrocortisone cream.  He was given a return to work note and instructed to return for any other ongoing concerns        Procedure  Procedures       [1]   Past Medical History:  Diagnosis Date    Hypertension    [2] History reviewed. No pertinent surgical history.  [3] No family history on file.  [4]   Social History  Tobacco Use    Smoking status: Former     Types: Cigarettes    Smokeless tobacco: Never   Substance Use Topics    Alcohol use: Not Currently    Drug use: Not Currently        Leonard Owusu,   06/02/25 0298

## 2025-09-02 ENCOUNTER — HOSPITAL ENCOUNTER (EMERGENCY)
Dept: CARDIOLOGY | Facility: HOSPITAL | Age: 25
Discharge: HOME | End: 2025-09-02
Payer: COMMERCIAL

## 2025-09-02 ENCOUNTER — HOSPITAL ENCOUNTER (EMERGENCY)
Facility: HOSPITAL | Age: 25
Discharge: LEFT BEFORE DISCHARGE | End: 2025-09-02
Attending: EMERGENCY MEDICINE
Payer: COMMERCIAL

## 2025-09-02 VITALS
TEMPERATURE: 98.3 F | WEIGHT: 266 LBS | SYSTOLIC BLOOD PRESSURE: 152 MMHG | HEIGHT: 67 IN | DIASTOLIC BLOOD PRESSURE: 91 MMHG | BODY MASS INDEX: 41.75 KG/M2 | OXYGEN SATURATION: 97 % | HEART RATE: 97 BPM | RESPIRATION RATE: 18 BRPM

## 2025-09-02 DIAGNOSIS — R06.02 SHORTNESS OF BREATH: Primary | ICD-10-CM

## 2025-09-02 PROCEDURE — 93005 ELECTROCARDIOGRAM TRACING: CPT

## 2025-09-02 PROCEDURE — 99283 EMERGENCY DEPT VISIT LOW MDM: CPT

## 2025-09-02 PROCEDURE — 99282 EMERGENCY DEPT VISIT SF MDM: CPT | Performed by: EMERGENCY MEDICINE

## 2025-09-02 ASSESSMENT — PAIN DESCRIPTION - ORIENTATION: ORIENTATION: LEFT

## 2025-09-02 ASSESSMENT — PAIN SCALES - GENERAL
PAINLEVEL_OUTOF10: 4

## 2025-09-02 ASSESSMENT — PAIN DESCRIPTION - DESCRIPTORS: DESCRIPTORS: DISCOMFORT

## 2025-09-02 ASSESSMENT — PAIN - FUNCTIONAL ASSESSMENT: PAIN_FUNCTIONAL_ASSESSMENT: 0-10

## 2025-09-02 ASSESSMENT — PAIN DESCRIPTION - PAIN TYPE: TYPE: ACUTE PAIN

## 2025-09-02 ASSESSMENT — PAIN DESCRIPTION - LOCATION: LOCATION: LEG

## 2025-09-04 LAB
ATRIAL RATE: 77 BPM
P AXIS: 56 DEGREES
P OFFSET: 207 MS
P ONSET: 142 MS
PR INTERVAL: 162 MS
Q ONSET: 223 MS
QRS COUNT: 12 BEATS
QRS DURATION: 88 MS
QT INTERVAL: 364 MS
QTC CALCULATION(BAZETT): 411 MS
QTC FREDERICIA: 395 MS
R AXIS: 66 DEGREES
T AXIS: 63 DEGREES
T OFFSET: 405 MS
VENTRICULAR RATE: 77 BPM